# Patient Record
Sex: FEMALE | URBAN - METROPOLITAN AREA
[De-identification: names, ages, dates, MRNs, and addresses within clinical notes are randomized per-mention and may not be internally consistent; named-entity substitution may affect disease eponyms.]

---

## 2021-11-17 ENCOUNTER — APPOINTMENT (RX ONLY)
Dept: URBAN - METROPOLITAN AREA CLINIC 142 | Facility: CLINIC | Age: 56
Setting detail: DERMATOLOGY
End: 2021-11-17

## 2021-11-17 DIAGNOSIS — L81.4 OTHER MELANIN HYPERPIGMENTATION: ICD-10-CM

## 2021-11-17 DIAGNOSIS — Z71.89 OTHER SPECIFIED COUNSELING: ICD-10-CM

## 2021-11-17 DIAGNOSIS — D22 MELANOCYTIC NEVI: ICD-10-CM

## 2021-11-17 PROBLEM — D22.5 MELANOCYTIC NEVI OF TRUNK: Status: ACTIVE | Noted: 2021-11-17

## 2021-11-17 PROBLEM — D22.39 MELANOCYTIC NEVI OF OTHER PARTS OF FACE: Status: ACTIVE | Noted: 2021-11-17

## 2021-11-17 PROCEDURE — 99203 OFFICE O/P NEW LOW 30 MIN: CPT

## 2021-11-17 PROCEDURE — ? DEFER

## 2021-11-17 PROCEDURE — ? COUNSELING

## 2021-11-17 ASSESSMENT — LOCATION DETAILED DESCRIPTION DERM
LOCATION DETAILED: LEFT LATERAL BUCCAL CHEEK
LOCATION DETAILED: SUPERIOR THORACIC SPINE
LOCATION DETAILED: INFERIOR THORACIC SPINE

## 2021-11-17 ASSESSMENT — LOCATION SIMPLE DESCRIPTION DERM
LOCATION SIMPLE: LEFT CHEEK
LOCATION SIMPLE: UPPER BACK

## 2021-11-17 ASSESSMENT — LOCATION ZONE DERM
LOCATION ZONE: FACE
LOCATION ZONE: TRUNK

## 2021-11-17 NOTE — PROCEDURE: DEFER
Procedure To Be Performed At Next Visit: Shave Removal (Cosmetic)
Instructions (Optional): Pt quoted $150 plus pathology
Detail Level: Detailed
Introduction Text (Please End With A Colon): The following procedure was deferred:

## 2021-11-17 NOTE — PROCEDURE: MIPS QUALITY
Quality 155 (Denominator): Falls Plan Of Care: Plan of Care not Documented, Reason not Otherwise Specified
Detail Level: Detailed
Quality 154 Part B: Falls: Risk Screening (Should Be Reported With Measure 155.): Patient screened for future fall risk; documentation of no falls in the past year or only one fall without injury in the past year
Quality 431: Preventive Care And Screening: Unhealthy Alcohol Use - Screening: Patient identified as an unhealthy alcohol user when screened for unhealthy alcohol use using a systematic screening method and received brief counseling
Quality 47: Advance Care Plan: Advance Care Planning discussed and documented in the medical record; patient did not wish or was not able to name a surrogate decision maker or provide an advance care plan.
Quality 226: Preventive Care And Screening: Tobacco Use: Screening And Cessation Intervention: Patient screened for tobacco use and is an ex/non-smoker
Quality 154 Part A: Falls: Risk Assessment (Should Be Reported With Measure 155.): Falls risk assessment completed and documented in the past 12 months.
Quality 110: Preventive Care And Screening: Influenza Immunization: Influenza Immunization Ordered or Recommended, but not Administered due to system reason
Quality 111:Pneumonia Vaccination Status For Older Adults: Pneumococcal Vaccination not Administered or Previously Received, Reason not Otherwise Specified

## 2021-11-17 NOTE — HPI: SKIN LESION
What Type Of Note Output Would You Prefer (Optional)?: Bullet Format
How Severe Is Your Skin Lesion?: mild
Has Your Skin Lesion Been Treated?: not been treated
Is This A New Presentation, Or A Follow-Up?: Skin Lesion
Additional History: Pt would like lesion removed

## 2021-12-29 ENCOUNTER — APPOINTMENT (RX ONLY)
Dept: URBAN - METROPOLITAN AREA CLINIC 142 | Facility: CLINIC | Age: 56
Setting detail: DERMATOLOGY
End: 2021-12-29

## 2021-12-29 DIAGNOSIS — Z41.9 ENCOUNTER FOR PROCEDURE FOR PURPOSES OTHER THAN REMEDYING HEALTH STATE, UNSPECIFIED: ICD-10-CM

## 2021-12-29 PROCEDURE — ? COSMETIC CONSULTATION: FILLERS

## 2021-12-29 PROCEDURE — ? TREATMENT REGIMEN

## 2021-12-29 PROCEDURE — ? COSMETIC CONSULTATION: BOTOX

## 2021-12-29 NOTE — PROCEDURE: MIPS QUALITY
Quality 111:Pneumonia Vaccination Status For Older Adults: Pneumococcal Vaccination not Administered or Previously Received, Reason not Otherwise Specified
Quality 402: Tobacco Use And Help With Quitting Among Adolescents: Patient screened for tobacco and is an ex-smoker
Quality 431: Preventive Care And Screening: Unhealthy Alcohol Use - Screening: Patient not identified as an unhealthy alcohol user when screened for unhealthy alcohol use using a systematic screening method
Quality 47: Advance Care Plan: Advance Care Planning discussed and documented in the medical record; patient did not wish or was not able to name a surrogate decision maker or provide an advance care plan.
Quality 110: Preventive Care And Screening: Influenza Immunization: Influenza Immunization Administered during Influenza season
Quality 154 Part A: Falls: Risk Assessment (Should Be Reported With Measure 155.): Falls risk assessment completed and documented in the past 12 months.
Quality 155 (Denominator): Falls Plan Of Care: Plan of Care not Documented, Reason not Otherwise Specified
Quality 154 Part B: Falls: Risk Screening (Should Be Reported With Measure 155.): Patient screened for future fall risk; documentation of no falls in the past year or only one fall without injury in the past year
Detail Level: Detailed

## 2021-12-29 NOTE — PROCEDURE: TREATMENT REGIMEN
Plan: Botox - 8 units in upper cutaneous lip, 10 units in forehead, 8 units in glabella & 4 units in periorbital skin\\nFiller - 1 ml of Juvederm Ultra in vermillion lips, commissures, and marionette lines & 1 ml of Restylane Defyne in nasolabial folds
Detail Level: Zone

## 2022-02-11 ENCOUNTER — APPOINTMENT (RX ONLY)
Dept: URBAN - METROPOLITAN AREA CLINIC 142 | Facility: CLINIC | Age: 57
Setting detail: DERMATOLOGY
End: 2022-02-11

## 2022-02-11 DIAGNOSIS — L98.8 OTHER SPECIFIED DISORDERS OF THE SKIN AND SUBCUTANEOUS TISSUE: ICD-10-CM

## 2022-02-11 PROCEDURE — ? TREATMENT REGIMEN

## 2022-02-11 PROCEDURE — ? BOTOX (U OR CC)

## 2022-02-11 ASSESSMENT — LOCATION SIMPLE DESCRIPTION DERM
LOCATION SIMPLE: LEFT EYEBROW
LOCATION SIMPLE: GLABELLA
LOCATION SIMPLE: RIGHT FOREHEAD
LOCATION SIMPLE: LEFT FOREHEAD
LOCATION SIMPLE: RIGHT TEMPLE
LOCATION SIMPLE: RIGHT EYEBROW
LOCATION SIMPLE: RIGHT LIP
LOCATION SIMPLE: LEFT LIP

## 2022-02-11 ASSESSMENT — LOCATION DETAILED DESCRIPTION DERM
LOCATION DETAILED: RIGHT UPPER CUTANEOUS LIP
LOCATION DETAILED: RIGHT LATERAL FOREHEAD
LOCATION DETAILED: LEFT CENTRAL EYEBROW
LOCATION DETAILED: LEFT MEDIAL EYEBROW
LOCATION DETAILED: RIGHT SUPERIOR FOREHEAD
LOCATION DETAILED: LEFT SUPERIOR FOREHEAD
LOCATION DETAILED: RIGHT CENTRAL EYEBROW
LOCATION DETAILED: LEFT LATERAL EYEBROW
LOCATION DETAILED: LEFT MEDIAL FOREHEAD
LOCATION DETAILED: GLABELLA
LOCATION DETAILED: LEFT LATERAL FOREHEAD
LOCATION DETAILED: RIGHT MID TEMPLE
LOCATION DETAILED: LEFT UPPER CUTANEOUS LIP

## 2022-02-11 ASSESSMENT — LOCATION ZONE DERM
LOCATION ZONE: FACE
LOCATION ZONE: LIP

## 2022-02-11 NOTE — PROCEDURE: BOTOX (U OR CC)
Glabellar Complex Units: 8
Inferior Lateral Orbicularis Oculi Units: 0
Quantity Per Injection Site (Units Or Cc): 2 U
Including Pricing Information In The Note: No
Additional Area 1 Location: upper cutaneous lip along vermillion border
Document As Units Or Cc?: units
Detail Level: Detailed
Consent: Written consent obtained. Risks include but not limited to lid/brow ptosis, bruising, swelling, diplopia, temporary effect, incomplete chemical denervation.
Expiration Date (Month Year): 04/2023
Reconstitution Date (Optional): 02/09/2022
Post-Care Instructions: Patient instructed to not lie down for 4 hours and limit physical activity for 24 hours. Patient instructed not to travel by airplane for 48 hours.
Periorbital Skin Units/Cc: 4
Forehead Units/Cc: 10
Lot #: [id number]
Price (Use Numbers Only, No Special Characters Or $): 931

## 2022-02-11 NOTE — PROCEDURE: MIPS QUALITY
Quality 154 Part A: Falls: Risk Assessment (Should Be Reported With Measure 155.): Falls risk assessment completed and documented in the past 12 months.
Detail Level: Detailed
Quality 47: Advance Care Plan: Advance Care Planning discussed and documented in the medical record; patient did not wish or was not able to name a surrogate decision maker or provide an advance care plan.
Quality 154 Part B: Falls: Risk Screening (Should Be Reported With Measure 155.): Patient screened for future fall risk; documentation of no falls in the past year or only one fall without injury in the past year
Quality 111:Pneumonia Vaccination Status For Older Adults: Pneumococcal Vaccination not Administered or Previously Received, Reason not Otherwise Specified
Quality 431: Preventive Care And Screening: Unhealthy Alcohol Use - Screening: Patient not identified as an unhealthy alcohol user when screened for unhealthy alcohol use using a systematic screening method
Quality 402: Tobacco Use And Help With Quitting Among Adolescents: Patient screened for tobacco and is an ex-smoker
Quality 155 (Denominator): Falls Plan Of Care: Plan of Care not Documented, Reason not Otherwise Specified
Quality 110: Preventive Care And Screening: Influenza Immunization: Influenza Immunization Administered during Influenza season

## 2022-03-11 ENCOUNTER — APPOINTMENT (RX ONLY)
Dept: URBAN - METROPOLITAN AREA CLINIC 142 | Facility: CLINIC | Age: 57
Setting detail: DERMATOLOGY
End: 2022-03-11

## 2022-03-11 DIAGNOSIS — L98.8 OTHER SPECIFIED DISORDERS OF THE SKIN AND SUBCUTANEOUS TISSUE: ICD-10-CM

## 2022-03-11 PROCEDURE — ? BOTOX (U OR CC)

## 2022-03-11 ASSESSMENT — LOCATION DETAILED DESCRIPTION DERM
LOCATION DETAILED: RIGHT LATERAL CANTHUS
LOCATION DETAILED: RIGHT UPPER CUTANEOUS LIP
LOCATION DETAILED: LEFT SUPERIOR CENTRAL MALAR CHEEK
LOCATION DETAILED: LEFT LATERAL INFERIOR EYELID
LOCATION DETAILED: RIGHT SUPERIOR CENTRAL MALAR CHEEK
LOCATION DETAILED: RIGHT INFERIOR TEMPLE
LOCATION DETAILED: LEFT LATERAL CANTHUS
LOCATION DETAILED: LEFT UPPER CUTANEOUS LIP

## 2022-03-11 ASSESSMENT — LOCATION SIMPLE DESCRIPTION DERM
LOCATION SIMPLE: LEFT EYELID
LOCATION SIMPLE: RIGHT LIP
LOCATION SIMPLE: LEFT INFERIOR EYELID
LOCATION SIMPLE: RIGHT CHEEK
LOCATION SIMPLE: LEFT LIP
LOCATION SIMPLE: RIGHT EYELID
LOCATION SIMPLE: LEFT CHEEK
LOCATION SIMPLE: RIGHT TEMPLE

## 2022-03-11 ASSESSMENT — LOCATION ZONE DERM
LOCATION ZONE: EYELID
LOCATION ZONE: FACE
LOCATION ZONE: LIP

## 2022-03-11 NOTE — PROCEDURE: MIPS QUALITY
Quality 47: Advance Care Plan: Advance Care Planning discussed and documented in the medical record; patient did not wish or was not able to name a surrogate decision maker or provide an advance care plan.
Quality 154 Part B: Falls: Risk Screening (Should Be Reported With Measure 155.): Patient screened for future fall risk; documentation of no falls in the past year or only one fall without injury in the past year
Quality 111:Pneumonia Vaccination Status For Older Adults: Pneumococcal Vaccination not Administered or Previously Received, Reason not Otherwise Specified
Quality 154 Part A: Falls: Risk Assessment (Should Be Reported With Measure 155.): Falls risk assessment completed and documented in the past 12 months.
Quality 155 (Denominator): Falls Plan Of Care: Plan of Care not Documented, Reason not Otherwise Specified
Quality 110: Preventive Care And Screening: Influenza Immunization: Influenza Immunization Administered during Influenza season
Detail Level: Detailed
Quality 431: Preventive Care And Screening: Unhealthy Alcohol Use - Screening: Patient not identified as an unhealthy alcohol user when screened for unhealthy alcohol use using a systematic screening method
Quality 402: Tobacco Use And Help With Quitting Among Adolescents: Patient screened for tobacco and is an ex-smoker

## 2022-03-11 NOTE — PROCEDURE: BOTOX (U OR CC)
Additional Area 1 Location: upper cutaneous lip along vermillion border
Additional Area 4 Units: 0
Quantity Per Injection Site (Units Or Cc): 2 U
Expiration Date (Month Year): 5/2024
Including Pricing Information In The Note: No
Dilution (U/0.1 Cc): 4
Lot #: [id number]
Quantity Per Injection Site (Units Or Cc): 1 U
Detail Level: Detailed
Price (Use Numbers Only, No Special Characters Or $): 210
Reconstitution Date (Optional): 3/9/22
Document As Units Or Cc?: units
Consent: Written consent obtained. Risks include but not limited to lid/brow ptosis, bruising, swelling, diplopia, temporary effect, incomplete chemical denervation.
Periorbital Skin Units/Cc: 16
Post-Care Instructions: Patient instructed to not lie down for 4 hours and limit physical activity for 24 hours. Patient instructed not to travel by airplane for 48 hours.

## 2022-03-23 ENCOUNTER — APPOINTMENT (RX ONLY)
Dept: URBAN - METROPOLITAN AREA CLINIC 142 | Facility: CLINIC | Age: 57
Setting detail: DERMATOLOGY
End: 2022-03-23

## 2022-03-23 DIAGNOSIS — Z41.9 ENCOUNTER FOR PROCEDURE FOR PURPOSES OTHER THAN REMEDYING HEALTH STATE, UNSPECIFIED: ICD-10-CM

## 2022-03-23 PROCEDURE — ? FILLERS (CC)

## 2022-03-23 NOTE — PROCEDURE: MIPS QUALITY
Quality 154 Part A: Falls: Risk Assessment (Should Be Reported With Measure 155.): Falls risk assessment completed and documented in the past 12 months.
Detail Level: Detailed
Quality 111:Pneumonia Vaccination Status For Older Adults: Pneumococcal Vaccination not Administered or Previously Received, Reason not Otherwise Specified
Quality 47: Advance Care Plan: Advance Care Planning discussed and documented in the medical record; patient did not wish or was not able to name a surrogate decision maker or provide an advance care plan.
Quality 154 Part B: Falls: Risk Screening (Should Be Reported With Measure 155.): Patient screened for future fall risk; documentation of no falls in the past year or only one fall without injury in the past year
Quality 402: Tobacco Use And Help With Quitting Among Adolescents: Patient screened for tobacco and is an ex-smoker
Quality 431: Preventive Care And Screening: Unhealthy Alcohol Use - Screening: Patient not identified as an unhealthy alcohol user when screened for unhealthy alcohol use using a systematic screening method
Quality 155 (Denominator): Falls Plan Of Care: Plan of Care not Documented, Reason not Otherwise Specified
Quality 110: Preventive Care And Screening: Influenza Immunization: Influenza Immunization Administered during Influenza season

## 2022-03-23 NOTE — PROCEDURE: FILLERS (CC)
Post-Care Instructions: Patient instructed to apply ice to reduce swelling.
Additional Area 2 Location: prejowl sulci, jawline and preauricular regions
Filler: Restylane Defyne
Detail Level: Zone
Lot #: [id number]
Nasolabial Folds Filler Volume In Cc: 0
Additional Area 1 Location: nasolabial folds, oral commissures, and marionette lines
Price (Use Numbers Only, No Special Characters Or $): 011
Consent: Written consent obtained. Risks include but not limited to bruising, beading, irregular texture, ulceration, infection, allergic reaction, scar formation, incomplete augmentation, temporary nature, procedural pain.
Expiration Date (Month Year): 6/30/2022
Additional Anesthesia Volume In Cc: 6
Anesthesia Type: 1% lidocaine with epinephrine
Additional Area 1 Volume In Cc: 1

## 2022-03-28 ENCOUNTER — APPOINTMENT (RX ONLY)
Dept: URBAN - METROPOLITAN AREA CLINIC 142 | Facility: CLINIC | Age: 57
Setting detail: DERMATOLOGY
End: 2022-03-28

## 2022-03-28 DIAGNOSIS — D22 MELANOCYTIC NEVI: ICD-10-CM

## 2022-03-28 PROBLEM — D22.39 MELANOCYTIC NEVI OF OTHER PARTS OF FACE: Status: ACTIVE | Noted: 2022-03-28

## 2022-03-28 PROCEDURE — ? BIOPSY BY SHAVE METHOD (COSMETIC)

## 2022-03-28 ASSESSMENT — LOCATION ZONE DERM: LOCATION ZONE: FACE

## 2022-03-28 ASSESSMENT — LOCATION SIMPLE DESCRIPTION DERM: LOCATION SIMPLE: LEFT CHEEK

## 2022-03-28 ASSESSMENT — LOCATION DETAILED DESCRIPTION DERM: LOCATION DETAILED: LEFT LATERAL BUCCAL CHEEK

## 2022-03-28 NOTE — PROCEDURE: MIPS QUALITY
Quality 155 (Denominator): Falls Plan Of Care: Plan of Care not Documented, Reason not Otherwise Specified
Quality 47: Advance Care Plan: Advance care planning not documented, reason not otherwise specified.
Quality 226: Preventive Care And Screening: Tobacco Use: Screening And Cessation Intervention: Patient screened for tobacco use, is a smoker AND did not received Cessation Counseling for Unknown Reasons
Quality 431: Preventive Care And Screening: Unhealthy Alcohol Use - Screening: Patient not identified as an unhealthy alcohol user when screened for unhealthy alcohol use using a systematic screening method
Detail Level: Detailed
Quality 154 Part A: Falls: Risk Assessment (Should Be Reported With Measure 155.): Falls risk assessment completed and documented in the past 12 months.
Quality 110: Preventive Care And Screening: Influenza Immunization: Influenza immunization was not ordered or administered, reason not given
Quality 154 Part B: Falls: Risk Screening (Should Be Reported With Measure 155.): Patient screened for future fall risk; documentation of no falls in the past year or only one fall without injury in the past year

## 2022-03-28 NOTE — PROCEDURE: BIOPSY BY SHAVE METHOD (COSMETIC)
Notification Instructions: Patient will be notified of biopsy results. However, patient instructed to call the office if not contacted within 2 weeks.
Price (Use Numbers Only, No Special Characters Or $): 150
Biopsy Type: H and E
Post-Care Instructions: I reviewed with the patient in detail post-care instructions. Patient is to keep the biopsy site dry overnight, and then apply bacitracin twice daily until healed. Patient may apply hydrogen peroxide soaks to remove any crusting.
X Size Of Lesion In Cm (Optional): 0
Detail Level: Detailed
Hemostasis: Drysol
Render Path Notes In Note?: No
Anesthesia Type: 1% lidocaine without epinephrine
Consent: Written consent was obtained and risks were reviewed including but not limited to scarring, infection, bleeding, scabbing, incomplete removal, nerve damage and allergy to anesthesia.
Biopsy Method: Dermablade
Wound Care: Petrolatum
Anesthesia Volume In Cc: 0.5
Billing Type: Third-Party Bill

## 2022-06-15 ENCOUNTER — APPOINTMENT (RX ONLY)
Dept: URBAN - METROPOLITAN AREA CLINIC 142 | Facility: CLINIC | Age: 57
Setting detail: DERMATOLOGY
End: 2022-06-15

## 2022-06-15 DIAGNOSIS — Z41.9 ENCOUNTER FOR PROCEDURE FOR PURPOSES OTHER THAN REMEDYING HEALTH STATE, UNSPECIFIED: ICD-10-CM

## 2022-06-15 DIAGNOSIS — L98.8 OTHER SPECIFIED DISORDERS OF THE SKIN AND SUBCUTANEOUS TISSUE: ICD-10-CM

## 2022-06-15 PROCEDURE — ? ADDITIONAL NOTES

## 2022-06-15 PROCEDURE — ? BOTOX (U OR CC)

## 2022-06-15 PROCEDURE — ? FILLERS (CC)

## 2022-06-15 ASSESSMENT — LOCATION SIMPLE DESCRIPTION DERM
LOCATION SIMPLE: RIGHT TEMPLE
LOCATION SIMPLE: LEFT EYEBROW
LOCATION SIMPLE: GLABELLA
LOCATION SIMPLE: RIGHT FOREHEAD
LOCATION SIMPLE: RIGHT CHEEK
LOCATION SIMPLE: INFERIOR FOREHEAD
LOCATION SIMPLE: LEFT EYELID
LOCATION SIMPLE: RIGHT EYEBROW
LOCATION SIMPLE: LEFT CHEEK
LOCATION SIMPLE: LEFT FOREHEAD

## 2022-06-15 ASSESSMENT — LOCATION DETAILED DESCRIPTION DERM
LOCATION DETAILED: INFERIOR MID FOREHEAD
LOCATION DETAILED: LEFT LATERAL EYEBROW
LOCATION DETAILED: RIGHT MEDIAL FOREHEAD
LOCATION DETAILED: GLABELLA
LOCATION DETAILED: RIGHT FOREHEAD
LOCATION DETAILED: RIGHT INFERIOR TEMPLE
LOCATION DETAILED: LEFT LATERAL FOREHEAD
LOCATION DETAILED: RIGHT SUPERIOR CENTRAL MALAR CHEEK
LOCATION DETAILED: LEFT SUPERIOR CENTRAL MALAR CHEEK
LOCATION DETAILED: RIGHT CENTRAL EYEBROW
LOCATION DETAILED: LEFT CENTRAL EYEBROW
LOCATION DETAILED: RIGHT MEDIAL EYEBROW
LOCATION DETAILED: LEFT MEDIAL EYEBROW
LOCATION DETAILED: LEFT FOREHEAD
LOCATION DETAILED: RIGHT LATERAL FOREHEAD
LOCATION DETAILED: RIGHT LATERAL EYEBROW
LOCATION DETAILED: LEFT LATERAL CANTHUS

## 2022-06-15 ASSESSMENT — LOCATION ZONE DERM
LOCATION ZONE: EYELID
LOCATION ZONE: FACE

## 2022-06-15 NOTE — PROCEDURE: ADDITIONAL NOTES
Render Risk Assessment In Note?: no
Detail Level: Simple
Additional Notes: Plan 2 syringes of Juvederm Voluma in the mid face (medial and lateral malar cheeks).

## 2022-06-15 NOTE — PROCEDURE: BOTOX (U OR CC)
Additional Area 1 Units: 0
Forehead Units/Cc: 18
Document As Units Or Cc?: units
Quantity Per Injection Site (Units Or Cc): 2 U
Additional Area 1 Location: upper and lower cutaneous lip along vermillion border
Quantity Per Injection Site (Units Or Cc): 4 U
Price (Use Numbers Only, No Special Characters Or $): 451
Glabellar Complex Units: 10
Detail Level: Detailed
Consent: Written consent obtained. Risks include but not limited to lid/brow ptosis, bruising, swelling, diplopia, temporary effect, incomplete chemical denervation.
Expiration Date (Month Year): 06/2024
Post-Care Instructions: Patient instructed to not lie down for 4 hours and limit physical activity for 24 hours. Patient instructed not to travel by airplane for 48 hours.
Dilution (U/0.1 Cc): 4
Periorbital Skin Units/Cc: 16
Lot #: [id number]
Including Pricing Information In The Note: No

## 2022-06-15 NOTE — PROCEDURE: MIPS QUALITY
Quality 154 Part B: Falls: Risk Screening (Should Be Reported With Measure 155.): Patient screened for future fall risk; documentation of no falls in the past year or only one fall without injury in the past year
Quality 154 Part A: Falls: Risk Assessment (Should Be Reported With Measure 155.): Falls risk assessment completed and documented in the past 12 months.
Quality 155 (Denominator): Falls Plan Of Care: Plan of Care not Documented, Reason not Otherwise Specified
Quality 47: Advance Care Plan: Advance care planning not documented, reason not otherwise specified.
Quality 226: Preventive Care And Screening: Tobacco Use: Screening And Cessation Intervention: Patient screened for tobacco use, is a smoker AND did not received Cessation Counseling for Unknown Reasons
Detail Level: Detailed
Quality 431: Preventive Care And Screening: Unhealthy Alcohol Use - Screening: Patient not identified as an unhealthy alcohol user when screened for unhealthy alcohol use using a systematic screening method
Quality 110: Preventive Care And Screening: Influenza Immunization: Influenza immunization was not ordered or administered, reason not given

## 2022-06-15 NOTE — PROCEDURE: FILLERS (CC)
Expiration Date (Month Year): 9/23
Vermilion Lips Filler Volume In Cc: 0
Detail Level: Zone
Post-Care Instructions: Patient instructed to apply ice to reduce swelling.
Anesthesia Type: 1% lidocaine with epinephrine
Additional Anesthesia Volume In Cc: 6
Filler: Juvederm Volbella XC
Lot #: [id number]
Price (Use Numbers Only, No Special Characters Or $): 737
Additional Area 1 Location: upper vermillion lip and ergotrid
Additional Area 2 Location: prejowl sulci, jawline and preauricular regions
Consent: Written consent obtained. Risks include but not limited to bruising, beading, irregular texture, ulceration, infection, allergic reaction, scar formation, incomplete augmentation, temporary nature, procedural pain.
Additional Area 1 Volume In Cc: 1

## 2022-08-17 ENCOUNTER — APPOINTMENT (RX ONLY)
Dept: URBAN - METROPOLITAN AREA CLINIC 142 | Facility: CLINIC | Age: 57
Setting detail: DERMATOLOGY
End: 2022-08-17

## 2022-08-17 DIAGNOSIS — Z41.9 ENCOUNTER FOR PROCEDURE FOR PURPOSES OTHER THAN REMEDYING HEALTH STATE, UNSPECIFIED: ICD-10-CM

## 2022-08-17 PROCEDURE — ? FILLERS (CC)

## 2022-08-17 PROCEDURE — ? ADDITIONAL NOTES

## 2022-08-17 NOTE — PROCEDURE: ADDITIONAL NOTES
Detail Level: Simple
Render Risk Assessment In Note?: no
Additional Notes: At follow up, will consider additional syringe(s) of Voluma in the lateral and medial malar cheeks as needed

## 2022-08-17 NOTE — PROCEDURE: MIPS QUALITY
Quality 155 (Denominator): Falls Plan Of Care: Plan of Care not Documented, Reason not Otherwise Specified
Detail Level: Detailed
Quality 431: Preventive Care And Screening: Unhealthy Alcohol Use - Screening: Patient not identified as an unhealthy alcohol user when screened for unhealthy alcohol use using a systematic screening method
Quality 110: Preventive Care And Screening: Influenza Immunization: Influenza immunization was not ordered or administered, reason not given
Quality 226: Preventive Care And Screening: Tobacco Use: Screening And Cessation Intervention: Patient screened for tobacco use, is a smoker AND did not received Cessation Counseling for Unknown Reasons
Quality 47: Advance Care Plan: Advance care planning not documented, reason not otherwise specified.
Quality 154 Part A: Falls: Risk Assessment (Should Be Reported With Measure 155.): Falls risk assessment completed and documented in the past 12 months.
Quality 154 Part B: Falls: Risk Screening (Should Be Reported With Measure 155.): Patient screened for future fall risk; documentation of no falls in the past year or only one fall without injury in the past year

## 2022-08-17 NOTE — PROCEDURE: FILLERS (CC)
Additional Area 2 Location: prejowl sulci, jawline and preauricular regions
Consent: Written consent obtained. Risks include but not limited to bruising, beading, irregular texture, ulceration, infection, allergic reaction, scar formation, incomplete augmentation, temporary nature, procedural pain.
Expiration Date (Month Year): 2023
Post-Care Instructions: Patient instructed to apply ice to reduce swelling.
Vermilion Lips Filler Volume In Cc: 0
Additional Anesthesia Volume In Cc: 6
Additional Area 1 Volume In Cc: 1
Additional Area 1 Location: medial malar cheeks
Filler: Juvederm Voluma XC
Lot #: [id number]
Price (Use Numbers Only, No Special Characters Or $): 707
Detail Level: Zone
Anesthesia Type: 1% lidocaine with epinephrine

## 2022-09-28 ENCOUNTER — APPOINTMENT (RX ONLY)
Dept: URBAN - METROPOLITAN AREA CLINIC 142 | Facility: CLINIC | Age: 57
Setting detail: DERMATOLOGY
End: 2022-09-28

## 2022-09-28 DIAGNOSIS — Z41.9 ENCOUNTER FOR PROCEDURE FOR PURPOSES OTHER THAN REMEDYING HEALTH STATE, UNSPECIFIED: ICD-10-CM

## 2022-09-28 PROCEDURE — ? INVENTORY

## 2022-09-28 PROCEDURE — ? FILLERS (CC)

## 2022-09-28 NOTE — PROCEDURE: MIPS QUALITY
Quality 111:Pneumonia Vaccination Status For Older Adults: Documentation of medical reason(s) for not administering pneumococcal vaccine (e.g., adverse reaction to vaccine)
Quality 431: Preventive Care And Screening: Unhealthy Alcohol Use - Screening: Patient not identified as an unhealthy alcohol user when screened for unhealthy alcohol use using a systematic screening method
Quality 155 (Denominator): Falls Plan Of Care: Plan of Care not Documented, Reason not Otherwise Specified
Quality 154 Part A: Falls: Risk Assessment (Should Be Reported With Measure 155.): Falls risk assessment completed and documented in the past 12 months.
Quality 110: Preventive Care And Screening: Influenza Immunization: Influenza Immunization not Administered for Documented Reasons.
Quality 47: Advance Care Plan: Advance Care Planning discussed and documented in the medical record; patient did not wish or was not able to name a surrogate decision maker or provide an advance care plan.
Detail Level: Detailed
Quality 154 Part B: Falls: Risk Screening (Should Be Reported With Measure 155.): Patient screened for future fall risk; documentation of no falls in the past year or only one fall without injury in the past year
Quality 226: Preventive Care And Screening: Tobacco Use: Screening And Cessation Intervention: Patient screened for tobacco use and is an ex/non-smoker

## 2022-09-28 NOTE — PROCEDURE: FILLERS (CC)
Additional Area 2 Volume In Cc: 0
Consent: Written consent obtained. Risks include but not limited to bruising, beading, irregular texture, ulceration, infection, allergic reaction, scar formation, incomplete augmentation, temporary nature, procedural pain.
Anesthesia Type: 1% lidocaine with epinephrine
Additional Area 1 Location: medial malar cheeks
Detail Level: Detailed
Anesthesia Volume In Cc: 0.5
Filler: Voluma
Post-Care Instructions: Patient instructed to apply ice to reduce swelling.
Additional Area 1 Volume In Cc: 1
Additional Anesthesia Volume In Cc: 6

## 2022-10-28 ENCOUNTER — APPOINTMENT (RX ONLY)
Dept: URBAN - METROPOLITAN AREA CLINIC 142 | Facility: CLINIC | Age: 57
Setting detail: DERMATOLOGY
End: 2022-10-28

## 2022-10-28 DIAGNOSIS — Z41.9 ENCOUNTER FOR PROCEDURE FOR PURPOSES OTHER THAN REMEDYING HEALTH STATE, UNSPECIFIED: ICD-10-CM

## 2022-10-28 PROCEDURE — ? FILLERS (CC)

## 2022-10-28 PROCEDURE — ? INVENTORY

## 2022-10-28 NOTE — PROCEDURE: FILLERS (CC)
Expiration Date (Month Year): 9/6/2023
Vermilion Lips Filler Volume In Cc: 0
Consent: Written consent obtained. Risks include but not limited to bruising, beading, irregular texture, ulceration, infection, allergic reaction, scar formation, incomplete augmentation, temporary nature, procedural pain.
Anesthesia Type: 1% lidocaine with epinephrine
Anesthesia Volume In Cc: 0.5
Additional Area 1 Location: upper cutaneous lip and left smile line
Filler: Volbella
Lot #: [id number]
Detail Level: Detailed
Additional Area 1 Volume In Cc: 1
Additional Anesthesia Volume In Cc: 6
Post-Care Instructions: Patient instructed to apply ice to reduce swelling.

## 2022-12-21 ENCOUNTER — APPOINTMENT (RX ONLY)
Dept: URBAN - METROPOLITAN AREA CLINIC 142 | Facility: CLINIC | Age: 57
Setting detail: DERMATOLOGY
End: 2022-12-21

## 2022-12-21 DIAGNOSIS — L98.8 OTHER SPECIFIED DISORDERS OF THE SKIN AND SUBCUTANEOUS TISSUE: ICD-10-CM

## 2022-12-21 PROCEDURE — ? INVENTORY

## 2022-12-21 PROCEDURE — ? BOTOX (U OR CC)

## 2022-12-21 ASSESSMENT — LOCATION SIMPLE DESCRIPTION DERM
LOCATION SIMPLE: LEFT EYEBROW
LOCATION SIMPLE: RIGHT EYEBROW
LOCATION SIMPLE: RIGHT FOREHEAD
LOCATION SIMPLE: LEFT CHEEK
LOCATION SIMPLE: RIGHT TEMPLE
LOCATION SIMPLE: LEFT EYELID
LOCATION SIMPLE: GLABELLA
LOCATION SIMPLE: INFERIOR FOREHEAD
LOCATION SIMPLE: LEFT FOREHEAD
LOCATION SIMPLE: RIGHT CHEEK

## 2022-12-21 ASSESSMENT — LOCATION DETAILED DESCRIPTION DERM
LOCATION DETAILED: LEFT LATERAL FOREHEAD
LOCATION DETAILED: LEFT LATERAL EYEBROW
LOCATION DETAILED: LEFT LATERAL CANTHUS
LOCATION DETAILED: RIGHT LATERAL EYEBROW
LOCATION DETAILED: LEFT SUPERIOR CENTRAL MALAR CHEEK
LOCATION DETAILED: RIGHT CENTRAL EYEBROW
LOCATION DETAILED: GLABELLA
LOCATION DETAILED: RIGHT FOREHEAD
LOCATION DETAILED: RIGHT LATERAL FOREHEAD
LOCATION DETAILED: RIGHT INFERIOR TEMPLE
LOCATION DETAILED: RIGHT SUPERIOR CENTRAL MALAR CHEEK
LOCATION DETAILED: RIGHT MEDIAL FOREHEAD
LOCATION DETAILED: LEFT CENTRAL EYEBROW
LOCATION DETAILED: INFERIOR MID FOREHEAD
LOCATION DETAILED: LEFT MEDIAL EYEBROW
LOCATION DETAILED: RIGHT MEDIAL EYEBROW
LOCATION DETAILED: LEFT FOREHEAD

## 2022-12-21 ASSESSMENT — LOCATION ZONE DERM
LOCATION ZONE: EYELID
LOCATION ZONE: FACE

## 2022-12-21 NOTE — PROCEDURE: BOTOX (U OR CC)
Additional Area 1 Units: 0
Forehead Units/Cc: 18
Document As Units Or Cc?: units
Additional Area 1 Location: upper and lower cutaneous lip along vermillion border
Glabellar Complex Units: 10
Detail Level: Detailed
Consent: Written consent obtained. Risks include but not limited to lid/brow ptosis, bruising, swelling, diplopia, temporary effect, incomplete chemical denervation.
Expiration Date (Month Year): 2/2025
Post-Care Instructions: Patient instructed to not lie down for 4 hours and limit physical activity for 24 hours. Patient instructed not to travel by airplane for 48 hours.
Dilution (U/0.1 Cc): 4
Periorbital Skin Units/Cc: 16
Lot #: [id number]
Including Pricing Information In The Note: No
Reconstitution Date (Optional): 12/19/22
Quantity Per Injection Site (Units Or Cc): 2 U
Quantity Per Injection Site (Units Or Cc): 4 U

## 2023-05-24 ENCOUNTER — APPOINTMENT (RX ONLY)
Dept: URBAN - METROPOLITAN AREA CLINIC 142 | Facility: CLINIC | Age: 58
Setting detail: DERMATOLOGY
End: 2023-05-24

## 2023-05-24 DIAGNOSIS — L98.8 OTHER SPECIFIED DISORDERS OF THE SKIN AND SUBCUTANEOUS TISSUE: ICD-10-CM

## 2023-05-24 PROCEDURE — ? BOTOX (U OR CC)

## 2023-05-24 PROCEDURE — ? TREATMENT REGIMEN

## 2023-05-24 PROCEDURE — ? INVENTORY

## 2023-05-24 PROCEDURE — ? ADDITIONAL NOTES

## 2023-05-24 ASSESSMENT — LOCATION DETAILED DESCRIPTION DERM
LOCATION DETAILED: RIGHT MEDIAL FOREHEAD
LOCATION DETAILED: RIGHT MEDIAL EYEBROW
LOCATION DETAILED: LEFT SUPERIOR CENTRAL MALAR CHEEK
LOCATION DETAILED: LEFT MEDIAL EYEBROW
LOCATION DETAILED: RIGHT CENTRAL EYEBROW
LOCATION DETAILED: LEFT FOREHEAD
LOCATION DETAILED: RIGHT INFERIOR TEMPLE
LOCATION DETAILED: LEFT LATERAL EYEBROW
LOCATION DETAILED: RIGHT FOREHEAD
LOCATION DETAILED: GLABELLA
LOCATION DETAILED: INFERIOR MID FOREHEAD
LOCATION DETAILED: LEFT LATERAL CANTHUS
LOCATION DETAILED: LEFT LATERAL FOREHEAD
LOCATION DETAILED: RIGHT LATERAL EYEBROW
LOCATION DETAILED: LEFT CENTRAL EYEBROW
LOCATION DETAILED: RIGHT LATERAL FOREHEAD
LOCATION DETAILED: RIGHT SUPERIOR CENTRAL MALAR CHEEK

## 2023-05-24 ASSESSMENT — LOCATION SIMPLE DESCRIPTION DERM
LOCATION SIMPLE: RIGHT CHEEK
LOCATION SIMPLE: RIGHT EYEBROW
LOCATION SIMPLE: GLABELLA
LOCATION SIMPLE: RIGHT TEMPLE
LOCATION SIMPLE: LEFT FOREHEAD
LOCATION SIMPLE: RIGHT FOREHEAD
LOCATION SIMPLE: LEFT EYELID
LOCATION SIMPLE: LEFT CHEEK
LOCATION SIMPLE: LEFT EYEBROW
LOCATION SIMPLE: INFERIOR FOREHEAD

## 2023-05-24 ASSESSMENT — LOCATION ZONE DERM
LOCATION ZONE: EYELID
LOCATION ZONE: FACE

## 2023-05-24 NOTE — PROCEDURE: BOTOX (U OR CC)
Additional Area 1 Units: 0
Forehead Units/Cc: 18
Document As Units Or Cc?: units
Additional Area 1 Location: upper and lower cutaneous lip along vermillion border
Glabellar Complex Units: 10
Detail Level: Detailed
Consent: Written consent obtained. Risks include but not limited to lid/brow ptosis, bruising, swelling, diplopia, temporary effect, incomplete chemical denervation.
Expiration Date (Month Year): 2/2025
Post-Care Instructions: Patient instructed to not lie down for 4 hours and limit physical activity for 24 hours. Patient instructed not to travel by airplane for 48 hours.
Dilution (U/0.1 Cc): 4
Periorbital Skin Units/Cc: 16
Lot #: [id number]
Including Pricing Information In The Note: No
Reconstitution Date (Optional): 12/19/22
Quantity Per Injection Site (Units Or Cc): 2 U
Quantity Per Injection Site (Units Or Cc): 4 U

## 2023-05-24 NOTE — PROCEDURE: ADDITIONAL NOTES
Detail Level: Simple
Additional Notes: 24 units were used from sample vial due to lack of inventory
Render Risk Assessment In Note?: no

## 2023-06-14 ENCOUNTER — APPOINTMENT (RX ONLY)
Dept: URBAN - METROPOLITAN AREA CLINIC 142 | Facility: CLINIC | Age: 58
Setting detail: DERMATOLOGY
End: 2023-06-14

## 2023-06-14 DIAGNOSIS — Z41.9 ENCOUNTER FOR PROCEDURE FOR PURPOSES OTHER THAN REMEDYING HEALTH STATE, UNSPECIFIED: ICD-10-CM

## 2023-06-14 PROCEDURE — ? INVENTORY

## 2023-06-14 PROCEDURE — ? FILLERS (CC)

## 2023-06-14 NOTE — PROCEDURE: FILLERS (CC)
Additional Area 1 Location: ergotrid and smile lines
Expiration Date (Month Year): 8/3/23
Post-Care Instructions: Patient instructed to apply ice to reduce swelling.
Vermilion Lips Filler Volume In Cc: 0
Additional Anesthesia Volume In Cc: 6
Detail Level: Detailed
Anesthesia Volume In Cc: 0.5
Filler: Juvederm Vollure XC
Anesthesia Type: 1% lidocaine with epinephrine
Lot #: [id number]
Consent: Written consent obtained. Risks include but not limited to bruising, beading, irregular texture, ulceration, infection, allergic reaction, scar formation, incomplete augmentation, temporary nature, procedural pain.
Additional Area 1 Volume In Cc: 1

## 2023-06-21 ENCOUNTER — APPOINTMENT (RX ONLY)
Dept: URBAN - METROPOLITAN AREA CLINIC 142 | Facility: CLINIC | Age: 58
Setting detail: DERMATOLOGY
End: 2023-06-21

## 2023-06-21 DIAGNOSIS — B07.8 OTHER VIRAL WARTS: ICD-10-CM

## 2023-06-21 DIAGNOSIS — L81.4 OTHER MELANIN HYPERPIGMENTATION: ICD-10-CM

## 2023-06-21 DIAGNOSIS — L71.8 OTHER ROSACEA: ICD-10-CM | Status: INADEQUATELY CONTROLLED

## 2023-06-21 DIAGNOSIS — L57.0 ACTINIC KERATOSIS: ICD-10-CM

## 2023-06-21 DIAGNOSIS — Z71.89 OTHER SPECIFIED COUNSELING: ICD-10-CM

## 2023-06-21 DIAGNOSIS — D22 MELANOCYTIC NEVI: ICD-10-CM

## 2023-06-21 PROBLEM — D22.5 MELANOCYTIC NEVI OF TRUNK: Status: ACTIVE | Noted: 2023-06-21

## 2023-06-21 PROCEDURE — ? TREATMENT REGIMEN

## 2023-06-21 PROCEDURE — 17110 DESTRUCTION B9 LES UP TO 14: CPT

## 2023-06-21 PROCEDURE — ? IN-HOUSE DISPENSING PHARMACY

## 2023-06-21 PROCEDURE — 17003 DESTRUCT PREMALG LES 2-14: CPT | Mod: 59

## 2023-06-21 PROCEDURE — 99214 OFFICE O/P EST MOD 30 MIN: CPT | Mod: 25

## 2023-06-21 PROCEDURE — ? COUNSELING

## 2023-06-21 PROCEDURE — ? INVENTORY

## 2023-06-21 PROCEDURE — ? LIQUID NITROGEN

## 2023-06-21 PROCEDURE — 17000 DESTRUCT PREMALG LESION: CPT | Mod: 59

## 2023-06-21 ASSESSMENT — LOCATION ZONE DERM
LOCATION ZONE: NOSE
LOCATION ZONE: FACE
LOCATION ZONE: TRUNK
LOCATION ZONE: FINGER

## 2023-06-21 ASSESSMENT — LOCATION DETAILED DESCRIPTION DERM
LOCATION DETAILED: RIGHT DISTAL VENTRAL THUMB
LOCATION DETAILED: RIGHT MEDIAL ZYGOMA
LOCATION DETAILED: LEFT INFERIOR MEDIAL FOREHEAD
LOCATION DETAILED: RIGHT NASAL SIDEWALL
LOCATION DETAILED: SUPERIOR THORACIC SPINE

## 2023-06-21 ASSESSMENT — SEVERITY ASSESSMENT OVERALL AMONG ALL PATIENTS
IN YOUR EXPERIENCE, AMONG ALL PATIENTS YOU HAVE SEEN WITH THIS CONDITION, HOW SEVERE IS THIS PATIENT'S CONDITION?: MINIMAL

## 2023-06-21 ASSESSMENT — LOCATION SIMPLE DESCRIPTION DERM
LOCATION SIMPLE: RIGHT NOSE
LOCATION SIMPLE: UPPER BACK
LOCATION SIMPLE: RIGHT THUMB
LOCATION SIMPLE: RIGHT ZYGOMA
LOCATION SIMPLE: LEFT FOREHEAD

## 2023-06-21 NOTE — HPI: RASH (ROSACEA)
How Severe Is Your Rosacea?: mild
Is This A New Presentation, Or A Follow-Up?: Rosacea
Additional History: Pt states getting facials and was told she may have rosacea.

## 2023-06-21 NOTE — PROCEDURE: LIQUID NITROGEN
Post-Care Instructions: I reviewed with the patient in detail post-care instructions. Patient is to wear sunprotection, and avoid picking at any of the treated lesions. Pt may apply Vaseline to crusted or scabbing areas.
Render Post-Care Instructions In Note?: no
Duration Of Freeze Thaw-Cycle (Seconds): 0
Detail Level: Detailed
Consent: The patient's consent was obtained including but not limited to risks of crusting, scabbing, blistering, scarring, darker or lighter pigmentary change, recurrence, incomplete removal and infection.
Show Aperture Variable?: Yes
Pared With?: Dermablade
Medical Necessity Clause: This procedure was medically necessary because the lesions that were treated were:
Spray Paint Text: The liquid nitrogen was applied to the skin utilizing a spray paint frosting technique.
Number Of Freeze-Thaw Cycles: 2 freeze-thaw cycles
Medical Necessity Information: It is in your best interest to select a reason for this procedure from the list below. All of these items fulfill various CMS LCD requirements except the new and changing color options.

## 2023-06-21 NOTE — PROCEDURE: TREATMENT REGIMEN
Initiate Treatment: Tretinoin 0.5% Apply to face QOHS x 1wk then increase to nightly as tolerated
Detail Level: Detailed
Plan: Pt declined tx for centrofacial erythema

## 2023-06-21 NOTE — PROCEDURE: IN-HOUSE DISPENSING PHARMACY
Product 8 Refills: 0
Product 46 Unit Type: mg
Product 3 Application Directions: Apply to face QOHS x 1 week, then increase to nightly as tolerated. 3 months on, 1 month off, repeat as needed
Name Of Product 11: Tretinoin 0.05%
Product 6 Application Directions: Apply to face QOHS x 1wk then increase to nightly as tolerated
Product 1 Refills: 2
Product 1 Amount/Unit (Numbers Only): 30
Product 11 Units Dispensed: 1
Name Of Product 4: ROSACEA TRIPLE GEL\\n(Ivermectin 1% / Metronidazole 1% / Niacinamide 4% / Potassium Azeloyl Diglycinate 5%)
Product 11 Application Directions: Apply to face QOHS x 1 week then increase to nightly as tolerated
Product 2 Application Directions: Apply to face Mon, Wed, Fri night x 2 weeks, take 2 week break, then repeat once
Product 2 Unit Type: ml
Name Of Product 3: HYDROQUINONE COMBO\\n(Hydrocortisone 0.05% / hydroquinone 8% / kojic acid 4% / tretinoin 0.025%)
Product 5 Application Directions: Apply to face QAM
Name Of Product 1: Acne combo gel \\n(BPO 2.5%; Clindamycin 1%; Tretinoin 0.025%)
Product 6 Refills: 4
Product 1 Application Directions: Apply to face QHS
Name Of Product 2: Actinic Keratosis Gel\\n(Imiquimod 5% / Levocetirizine Dihydrochloride 1% / Tretinoin 0.05%)
Product 4 Application Directions: Apply to face QD
Detail Level: Zone
Send Charges To Patient Encounter: Yes
Name Of Product 5: ROSACEA GEL with BRIMONIDINE \\n(Brimonidine Tartate 0.25% / Ivermectin 1% / Metronidazole 1% / Niacinamide 4% / Potassium Azeloyl Diglycinate 5%)

## 2023-06-21 NOTE — PROCEDURE: COUNSELING
Detail Level: Simple
Sunscreen Recommendations: SPF 30-50 with zinc oxide
Detail Level: Generalized
- - -
Detail Level: Detailed
Detail Level: Zone

## 2023-07-12 ENCOUNTER — APPOINTMENT (RX ONLY)
Dept: URBAN - METROPOLITAN AREA CLINIC 142 | Facility: CLINIC | Age: 58
Setting detail: DERMATOLOGY
End: 2023-07-12

## 2023-07-12 DIAGNOSIS — B07.8 OTHER VIRAL WARTS: ICD-10-CM | Status: IMPROVED

## 2023-07-12 PROCEDURE — ? LIQUID NITROGEN

## 2023-07-12 PROCEDURE — ? COUNSELING

## 2023-07-12 PROCEDURE — 17110 DESTRUCTION B9 LES UP TO 14: CPT

## 2023-07-12 ASSESSMENT — LOCATION DETAILED DESCRIPTION DERM: LOCATION DETAILED: RIGHT DISTAL VENTRAL THUMB

## 2023-07-12 ASSESSMENT — LOCATION SIMPLE DESCRIPTION DERM: LOCATION SIMPLE: RIGHT THUMB

## 2023-07-12 ASSESSMENT — LOCATION ZONE DERM: LOCATION ZONE: FINGER

## 2023-07-12 NOTE — PROCEDURE: LIQUID NITROGEN
Detail Level: Detailed
Consent: The patient's consent was obtained including but not limited to risks of crusting, scabbing, blistering, scarring, darker or lighter pigmentary change, recurrence, incomplete removal and infection.
Pared With?: Dermablade
Medical Necessity Clause: This procedure was medically necessary because the lesions that were treated were:
Spray Paint Text: The liquid nitrogen was applied to the skin utilizing a spray paint frosting technique.
Render Note In Bullet Format When Appropriate: No
Show Applicator Variable?: Yes
Number Of Freeze-Thaw Cycles: 2 freeze-thaw cycles
Medical Necessity Information: It is in your best interest to select a reason for this procedure from the list below. All of these items fulfill various CMS LCD requirements except the new and changing color options.
Post-Care Instructions: I reviewed with the patient in detail post-care instructions. Patient is to wear sunprotection, and avoid picking at any of the treated lesions. Pt may apply Vaseline to crusted or scabbing areas.

## 2023-09-27 ENCOUNTER — APPOINTMENT (RX ONLY)
Dept: URBAN - METROPOLITAN AREA CLINIC 142 | Facility: CLINIC | Age: 58
Setting detail: DERMATOLOGY
End: 2023-09-27

## 2023-09-27 DIAGNOSIS — L98.8 OTHER SPECIFIED DISORDERS OF THE SKIN AND SUBCUTANEOUS TISSUE: ICD-10-CM

## 2023-09-27 PROCEDURE — ? BOTOX (U OR CC)

## 2023-09-27 PROCEDURE — ? INVENTORY

## 2023-09-27 ASSESSMENT — LOCATION SIMPLE DESCRIPTION DERM
LOCATION SIMPLE: RIGHT FOREHEAD
LOCATION SIMPLE: LEFT EYELID
LOCATION SIMPLE: LEFT EYEBROW
LOCATION SIMPLE: GLABELLA
LOCATION SIMPLE: RIGHT EYEBROW
LOCATION SIMPLE: RIGHT CHEEK
LOCATION SIMPLE: RIGHT TEMPLE
LOCATION SIMPLE: INFERIOR FOREHEAD
LOCATION SIMPLE: LEFT FOREHEAD
LOCATION SIMPLE: LEFT CHEEK

## 2023-09-27 ASSESSMENT — LOCATION DETAILED DESCRIPTION DERM
LOCATION DETAILED: RIGHT CENTRAL EYEBROW
LOCATION DETAILED: RIGHT MEDIAL EYEBROW
LOCATION DETAILED: RIGHT INFERIOR TEMPLE
LOCATION DETAILED: LEFT LATERAL FOREHEAD
LOCATION DETAILED: RIGHT MEDIAL FOREHEAD
LOCATION DETAILED: RIGHT LATERAL EYEBROW
LOCATION DETAILED: LEFT MEDIAL EYEBROW
LOCATION DETAILED: LEFT LATERAL EYEBROW
LOCATION DETAILED: LEFT LATERAL CANTHUS
LOCATION DETAILED: RIGHT SUPERIOR CENTRAL MALAR CHEEK
LOCATION DETAILED: INFERIOR MID FOREHEAD
LOCATION DETAILED: RIGHT LATERAL FOREHEAD
LOCATION DETAILED: LEFT CENTRAL EYEBROW
LOCATION DETAILED: LEFT SUPERIOR CENTRAL MALAR CHEEK
LOCATION DETAILED: GLABELLA
LOCATION DETAILED: RIGHT FOREHEAD
LOCATION DETAILED: LEFT FOREHEAD

## 2023-09-27 ASSESSMENT — LOCATION ZONE DERM
LOCATION ZONE: EYELID
LOCATION ZONE: FACE

## 2023-09-27 NOTE — PROCEDURE: BOTOX (U OR CC)
Additional Area 1 Units: 0
Forehead Units/Cc: 18
Document As Units Or Cc?: units
Additional Area 1 Location: upper and lower cutaneous lip along vermillion border
Glabellar Complex Units: 10
Detail Level: Detailed
Consent: Written consent obtained. Risks include but not limited to lid/brow ptosis, bruising, swelling, diplopia, temporary effect, incomplete chemical denervation.
Expiration Date (Month Year): 4/2026
Post-Care Instructions: Patient instructed to not lie down for 4 hours and limit physical activity for 24 hours. Patient instructed not to travel by airplane for 48 hours.
Dilution (U/0.1 Cc): 4
Periorbital Skin Units/Cc: 16
Lot #: [id number]
Including Pricing Information In The Note: No
Reconstitution Date (Optional): 9/26/23
Quantity Per Injection Site (Units Or Cc): 2 U
Quantity Per Injection Site (Units Or Cc): 4 U

## 2024-02-07 ENCOUNTER — APPOINTMENT (RX ONLY)
Dept: URBAN - METROPOLITAN AREA CLINIC 142 | Facility: CLINIC | Age: 59
Setting detail: DERMATOLOGY
End: 2024-02-07

## 2024-02-07 DIAGNOSIS — Z41.9 ENCOUNTER FOR PROCEDURE FOR PURPOSES OTHER THAN REMEDYING HEALTH STATE, UNSPECIFIED: ICD-10-CM

## 2024-02-07 PROCEDURE — ? INVENTORY

## 2024-02-07 PROCEDURE — ? BOTOX (U OR CC)

## 2024-02-07 ASSESSMENT — LOCATION DETAILED DESCRIPTION DERM
LOCATION DETAILED: RIGHT MEDIAL FOREHEAD
LOCATION DETAILED: GLABELLA
LOCATION DETAILED: INFERIOR MID FOREHEAD
LOCATION DETAILED: LEFT CENTRAL EYEBROW
LOCATION DETAILED: LEFT LATERAL FOREHEAD
LOCATION DETAILED: LEFT LATERAL CANTHUS
LOCATION DETAILED: RIGHT SUPERIOR CENTRAL MALAR CHEEK
LOCATION DETAILED: RIGHT FOREHEAD
LOCATION DETAILED: LEFT FOREHEAD
LOCATION DETAILED: RIGHT LATERAL EYEBROW
LOCATION DETAILED: RIGHT MEDIAL EYEBROW
LOCATION DETAILED: RIGHT LATERAL FOREHEAD
LOCATION DETAILED: LEFT LATERAL EYEBROW
LOCATION DETAILED: RIGHT CENTRAL EYEBROW
LOCATION DETAILED: LEFT SUPERIOR CENTRAL MALAR CHEEK
LOCATION DETAILED: LEFT MEDIAL EYEBROW
LOCATION DETAILED: RIGHT INFERIOR TEMPLE

## 2024-02-07 ASSESSMENT — LOCATION SIMPLE DESCRIPTION DERM
LOCATION SIMPLE: RIGHT FOREHEAD
LOCATION SIMPLE: RIGHT TEMPLE
LOCATION SIMPLE: LEFT FOREHEAD
LOCATION SIMPLE: INFERIOR FOREHEAD
LOCATION SIMPLE: LEFT EYELID
LOCATION SIMPLE: GLABELLA
LOCATION SIMPLE: LEFT EYEBROW
LOCATION SIMPLE: RIGHT CHEEK
LOCATION SIMPLE: RIGHT EYEBROW
LOCATION SIMPLE: LEFT CHEEK

## 2024-02-07 ASSESSMENT — LOCATION ZONE DERM
LOCATION ZONE: FACE
LOCATION ZONE: EYELID

## 2024-02-07 NOTE — PROCEDURE: BOTOX (U OR CC)
Additional Area 1 Units: 0
Forehead Units/Cc: 18
Document As Units Or Cc?: units
Additional Area 1 Location: upper and lower cutaneous lip along vermillion border
Glabellar Complex Units: 10
Detail Level: Detailed
Consent: Written consent obtained. Risks include but not limited to lid/brow ptosis, bruising, swelling, diplopia, temporary effect, incomplete chemical denervation.
Expiration Date (Month Year): 4/2026
Post-Care Instructions: Patient instructed to not lie down for 4 hours and limit physical activity for 24 hours. Patient instructed not to travel by airplane for 48 hours.
Dilution (U/0.1 Cc): 4
Periorbital Skin Units/Cc: 16
Lot #: [id number]
Including Pricing Information In The Note: No
Reconstitution Date (Optional): 2/7/24
Quantity Per Injection Site (Units Or Cc): 2 U
Quantity Per Injection Site (Units Or Cc): 4 U